# Patient Record
Sex: MALE | Race: WHITE | Employment: FULL TIME | URBAN - METROPOLITAN AREA
[De-identification: names, ages, dates, MRNs, and addresses within clinical notes are randomized per-mention and may not be internally consistent; named-entity substitution may affect disease eponyms.]

---

## 2024-07-23 ENCOUNTER — OFFICE VISIT (OUTPATIENT)
Dept: PRIMARY CARE CLINIC | Age: 49
End: 2024-07-23
Payer: COMMERCIAL

## 2024-07-23 VITALS
TEMPERATURE: 99 F | DIASTOLIC BLOOD PRESSURE: 82 MMHG | SYSTOLIC BLOOD PRESSURE: 134 MMHG | OXYGEN SATURATION: 97 % | WEIGHT: 224 LBS | HEIGHT: 69 IN | BODY MASS INDEX: 33.18 KG/M2 | HEART RATE: 105 BPM

## 2024-07-23 DIAGNOSIS — Z12.11 COLON CANCER SCREENING: ICD-10-CM

## 2024-07-23 DIAGNOSIS — R09.89 UPPER RESPIRATORY SYMPTOM: ICD-10-CM

## 2024-07-23 DIAGNOSIS — Z76.89 ENCOUNTER TO ESTABLISH CARE WITH NEW DOCTOR: Primary | ICD-10-CM

## 2024-07-23 DIAGNOSIS — H66.002 NON-RECURRENT ACUTE SUPPURATIVE OTITIS MEDIA OF LEFT EAR WITHOUT SPONTANEOUS RUPTURE OF TYMPANIC MEMBRANE: ICD-10-CM

## 2024-07-23 PROCEDURE — 99204 OFFICE O/P NEW MOD 45 MIN: CPT | Performed by: STUDENT IN AN ORGANIZED HEALTH CARE EDUCATION/TRAINING PROGRAM

## 2024-07-23 PROCEDURE — G2211 COMPLEX E/M VISIT ADD ON: HCPCS | Performed by: STUDENT IN AN ORGANIZED HEALTH CARE EDUCATION/TRAINING PROGRAM

## 2024-07-23 RX ORDER — AMOXICILLIN AND CLAVULANATE POTASSIUM 875; 125 MG/1; MG/1
1 TABLET, FILM COATED ORAL 2 TIMES DAILY
Qty: 10 TABLET | Refills: 0 | Status: SHIPPED | OUTPATIENT
Start: 2024-07-23 | End: 2024-07-28

## 2024-07-23 ASSESSMENT — ENCOUNTER SYMPTOMS
SORE THROAT: 0
SINUS PRESSURE: 0
NAUSEA: 1
ABDOMINAL PAIN: 0
SHORTNESS OF BREATH: 1
DIARRHEA: 0
COUGH: 1

## 2024-07-23 NOTE — PROGRESS NOTES
work  Referral for colonoscopy  Can discuss Shingles vaccine at follow-up    Return in about 3 months (around 10/23/2024) for Routine.    Orders Placed This Encounter   Procedures    CBC with Auto Differential     Standing Status:   Future     Standing Expiration Date:   7/23/2025    Comprehensive Metabolic Panel     Standing Status:   Future     Standing Expiration Date:   7/23/2025    Leonardo Kunz MD, General Surgery, [place]     Referral Priority:   Routine     Referral Type:   Eval and Treat     Referral Reason:   Specialty Services Required     Referred to Provider:   Leonardo Wallace MD     Requested Specialty:   General Surgery     Number of Visits Requested:   1     Orders Placed This Encounter   Medications    amoxicillin-clavulanate (AUGMENTIN) 875-125 MG per tablet     Sig: Take 1 tablet by mouth 2 times daily for 5 days     Dispense:  10 tablet     Refill:  0       Discussed use, benefit, and side effects of prescribed medications. All patient questions answered. Pt voiced understanding. Reviewed health maintenance. Patient agreed with treatment plan. Follow up as directed.     Electronically signed by Celestina Masy DO on 7/23/2024 at 5:06 PM

## 2024-09-09 ENCOUNTER — HOSPITAL ENCOUNTER (OUTPATIENT)
Age: 49
Setting detail: SPECIMEN
Discharge: HOME | End: 2024-09-09

## 2024-09-09 ENCOUNTER — OFFICE VISIT (OUTPATIENT)
Age: 49
End: 2024-09-09
Payer: COMMERCIAL

## 2024-09-09 VITALS
BODY MASS INDEX: 33.33 KG/M2 | SYSTOLIC BLOOD PRESSURE: 132 MMHG | HEART RATE: 77 BPM | DIASTOLIC BLOOD PRESSURE: 91 MMHG | HEIGHT: 69 IN | WEIGHT: 225 LBS | TEMPERATURE: 97.7 F

## 2024-09-09 DIAGNOSIS — R10.32 LEFT LOWER QUADRANT ABDOMINAL PAIN: ICD-10-CM

## 2024-09-09 DIAGNOSIS — K62.5 RECTAL BLEEDING: Primary | ICD-10-CM

## 2024-09-09 DIAGNOSIS — Z12.11 SCREENING FOR COLON CANCER: ICD-10-CM

## 2024-09-09 DIAGNOSIS — R10.11 RUQ ABDOMINAL PAIN: ICD-10-CM

## 2024-09-09 DIAGNOSIS — Z76.89 ENCOUNTER TO ESTABLISH CARE WITH NEW DOCTOR: ICD-10-CM

## 2024-09-09 LAB
ALBUMIN SERPL-MCNC: 5 G/DL (ref 3.5–5.2)
ALBUMIN/GLOB SERPL: 2 {RATIO} (ref 1–2.5)
ALP SERPL-CCNC: 73 U/L (ref 40–129)
ALT SERPL-CCNC: 39 U/L (ref 10–50)
ANION GAP SERPL CALCULATED.3IONS-SCNC: 12 MMOL/L (ref 9–16)
AST SERPL-CCNC: 31 U/L (ref 10–50)
BASOPHILS # BLD: 0.09 K/UL (ref 0–0.2)
BASOPHILS NFR BLD: 1 % (ref 0–2)
BILIRUB SERPL-MCNC: 0.4 MG/DL (ref 0–1.2)
BUN SERPL-MCNC: 16 MG/DL (ref 6–20)
CALCIUM SERPL-MCNC: 9.7 MG/DL (ref 8.6–10.4)
CHLORIDE SERPL-SCNC: 104 MMOL/L (ref 98–107)
CO2 SERPL-SCNC: 24 MMOL/L (ref 20–31)
CREAT SERPL-MCNC: 1 MG/DL (ref 0.7–1.2)
EOSINOPHIL # BLD: 0.32 K/UL (ref 0–0.44)
EOSINOPHILS RELATIVE PERCENT: 4 % (ref 1–4)
ERYTHROCYTE [DISTWIDTH] IN BLOOD BY AUTOMATED COUNT: 13.8 % (ref 11.8–14.4)
GFR, ESTIMATED: 87 ML/MIN/1.73M2
GLUCOSE SERPL-MCNC: 81 MG/DL (ref 74–99)
HCT VFR BLD AUTO: 47.5 % (ref 40.7–50.3)
HGB BLD-MCNC: 15 G/DL (ref 13–17)
IMM GRANULOCYTES # BLD AUTO: <0.03 K/UL (ref 0–0.3)
IMM GRANULOCYTES NFR BLD: 0 %
LYMPHOCYTES NFR BLD: 3.61 K/UL (ref 1.1–3.7)
LYMPHOCYTES RELATIVE PERCENT: 48 % (ref 24–43)
MCH RBC QN AUTO: 27.8 PG (ref 25.2–33.5)
MCHC RBC AUTO-ENTMCNC: 31.6 G/DL (ref 28.4–34.8)
MCV RBC AUTO: 88 FL (ref 82.6–102.9)
MONOCYTES NFR BLD: 0.74 K/UL (ref 0.1–1.2)
MONOCYTES NFR BLD: 10 % (ref 3–12)
NEUTROPHILS NFR BLD: 37 % (ref 36–65)
NEUTS SEG NFR BLD: 2.81 K/UL (ref 1.5–8.1)
NRBC BLD-RTO: 0 PER 100 WBC
PLATELET # BLD AUTO: 211 K/UL (ref 138–453)
PMV BLD AUTO: 9.7 FL (ref 8.1–13.5)
POTASSIUM SERPL-SCNC: 4.4 MMOL/L (ref 3.7–5.3)
PROT SERPL-MCNC: 7.5 G/DL (ref 6.6–8.7)
RBC # BLD AUTO: 5.4 M/UL (ref 4.21–5.77)
SODIUM SERPL-SCNC: 140 MMOL/L (ref 136–145)
WBC OTHER # BLD: 7.6 K/UL (ref 3.5–11.3)

## 2024-09-09 PROCEDURE — 99203 OFFICE O/P NEW LOW 30 MIN: CPT | Performed by: NURSE PRACTITIONER

## 2024-09-09 RX ORDER — IBUPROFEN 200 MG
200 TABLET ORAL EVERY 6 HOURS PRN
COMMUNITY

## 2024-09-09 ASSESSMENT — ENCOUNTER SYMPTOMS
SORE THROAT: 0
SHORTNESS OF BREATH: 0
COUGH: 0
RHINORRHEA: 0

## 2024-09-12 ENCOUNTER — TELEPHONE (OUTPATIENT)
Age: 49
End: 2024-09-12

## 2024-09-12 RX ORDER — BISACODYL 5 MG/1
TABLET, DELAYED RELEASE ORAL
Qty: 4 TABLET | Refills: 0 | Status: SHIPPED | OUTPATIENT
Start: 2024-09-12

## 2024-09-12 RX ORDER — POLYETHYLENE GLYCOL 3350 17 G/17G
POWDER, FOR SOLUTION ORAL
Qty: 238 G | Refills: 0 | Status: SHIPPED | OUTPATIENT
Start: 2024-09-12

## 2024-09-12 RX ORDER — ONDANSETRON 4 MG/1
4 TABLET, FILM COATED ORAL EVERY 6 HOURS PRN
Qty: 10 TABLET | Refills: 0 | Status: SHIPPED | OUTPATIENT
Start: 2024-09-12

## 2024-10-10 ENCOUNTER — HOSPITAL ENCOUNTER (OUTPATIENT)
Dept: PREADMISSION TESTING | Age: 49
Discharge: HOME | End: 2024-10-14

## 2024-10-10 VITALS — WEIGHT: 225 LBS | BODY MASS INDEX: 33.33 KG/M2 | HEIGHT: 69 IN

## 2024-10-10 NOTE — PROGRESS NOTES
Pre-op Instructions For Out-Patient Endoscopy Surgery    Medication Instructions:  Please stop herbs and any supplements now (includes vitamins and minerals).    Please contact your surgeon and prescribing physician for pre-op instructions for any blood thinners. STOP IBUPROFEN AS DIRECTED    If you have inhalers/aerosol treatments at home, please use them the morning of your surgery and bring the inhalers with you to the hospital.    Please take the following medications the morning of your surgery with a sip of water:    None    Surgery Instructions:  After midnight before surgery:  Do not eat or drink anything, including water, mints, gum, and hard candy.  You may brush your teeth without swallowing.  No smoking, chewing tobacco, or street drugs.    Please shower or bathe before surgery.       Please do not wear any cologne, lotion, powder, jewelry, piercings, perfume, makeup, nail polish, hair accessories, or hair spray on the day of surgery.  Wear loose comfortable clothing.    Leave your valuables at home but bring a payment source for any after-surgery prescriptions you plan to fill at [facility].  Bring a storage case for any glasses/contacts.    An adult who is responsible for you MUST drive you home and should be with you for the first 24 hours after surgery.     The Day of Surgery:  Arrive at [facility] Surgery Entrance at the time directed by your surgeon and check in at the desk.     If you have a living will or healthcare power of , please bring a copy.    You will be taken to the pre-op holding area where you will be prepared for surgery.  A physical assessment will be performed by a nurse practitioner or house officer.  Your IV will be started and you will meet your anesthesiologist.    When you go to surgery, your family will be directed to the surgical waiting room, where the doctor should speak with them after your surgery.    After surgery, you will be taken to

## 2024-10-28 ENCOUNTER — OFFICE VISIT (OUTPATIENT)
Dept: PRIMARY CARE CLINIC | Age: 49
End: 2024-10-28
Payer: COMMERCIAL

## 2024-10-28 VITALS
WEIGHT: 227 LBS | BODY MASS INDEX: 33.62 KG/M2 | HEIGHT: 69 IN | SYSTOLIC BLOOD PRESSURE: 134 MMHG | OXYGEN SATURATION: 94 % | HEART RATE: 90 BPM | DIASTOLIC BLOOD PRESSURE: 84 MMHG

## 2024-10-28 DIAGNOSIS — Z13.6 ENCOUNTER FOR SCREENING FOR CARDIOVASCULAR DISORDERS: Primary | ICD-10-CM

## 2024-10-28 PROCEDURE — 99213 OFFICE O/P EST LOW 20 MIN: CPT | Performed by: STUDENT IN AN ORGANIZED HEALTH CARE EDUCATION/TRAINING PROGRAM

## 2024-10-28 PROCEDURE — G2211 COMPLEX E/M VISIT ADD ON: HCPCS | Performed by: STUDENT IN AN ORGANIZED HEALTH CARE EDUCATION/TRAINING PROGRAM

## 2024-10-28 ASSESSMENT — ENCOUNTER SYMPTOMS
ABDOMINAL PAIN: 0
NAUSEA: 0
VOMITING: 0
SHORTNESS OF BREATH: 0

## 2024-10-28 NOTE — PROGRESS NOTES
[facility]  [facility]  [address]  [facility] [place] [place] [address]  Dept: [phone]    Travis Yoder IV is a 49 y.o. male established patient, who presents today for his medical conditions/complaints as noted below:    Chief Complaint   Patient presents with    Discuss Labs     Patient had lab work done     Other     Patient states his cough  and fatigue went away        HPI:     Here to discuss Shingles vaccine    Completed lab work. Scheduled for colonoscopy    Reviewed prior notes: previous PCP  Reviewed previous labs.    No components found for: \"LDLCHOLESTEROL\", \"LDLCALC\"    (goal LDL is <100)   AST (U/L)   Date Value   09/09/2024 31     ALT (U/L)   Date Value   09/09/2024 39     BUN (mg/dL)   Date Value   09/09/2024 16     BP Readings from Last 3 Encounters:   10/28/24 134/84   09/09/24 (!) 132/91   07/23/24 134/82          (goal 120/80)    No past medical history on file.   Past Surgical History:   Procedure Laterality Date    ADENOIDECTOMY Bilateral     as a child    VASECTOMY         No family history on file.    Social History     Tobacco Use    Smoking status: Never    Smokeless tobacco: Never   Substance Use Topics    Alcohol use: Not Currently      Current Outpatient Medications   Medication Sig Dispense Refill    bisacodyl (DULCOLAX) 5 MG EC tablet Take all 4 dulcolax tablets together by mouth at 10:00 AM the day prior to procedure. (Patient not taking: Reported on 10/28/2024) 4 tablet 0    polyethylene glycol (GLYCOLAX) 17 GM/SCOOP powder Mix 238 grams of Miralax with 64 ounces of Gatorade (not red or purple) and consume all liquid between 6 PM and 8 PM the evening prior to procedure. (Patient not taking: Reported on 10/28/2024) 238 g 0    ondansetron (ZOFRAN) 4 MG tablet Take 1 tablet by mouth every 6 hours as needed for Nausea or Vomiting (Patient not taking: Reported on 10/28/2024) 10 tablet 0    ibuprofen (ADVIL;MOTRIN) 200 MG tablet Take 1 tablet by

## 2024-10-31 NOTE — PRE-PROCEDURE INSTRUCTIONS
No answer, left message ? Yes, voicemail                            Unable to leave message ?    When were you told to arrive at hospital ?  0845    Do you have a  ?    Are you on any blood thinners ?    no                 If yes when did you stop taking ?    Do you have your prep Rx filled and instruction ?      Nothing to eat the day before , only clear liquids.    Are you experiencing any covid symptoms ?     Do you have any infections or rash we should be aware of ?      Do you have the Hibiclens soap to use the night before and the morning of surgery ?n/a    Nothing to eat or drink after midnight, only a sip of water to take any medication instructed to take the night before.  Wear comfortable clothing, leave any valuables at home, remove any jewelry and body piercing .

## 2024-11-01 ENCOUNTER — ANESTHESIA EVENT (OUTPATIENT)
Dept: ENDOSCOPY | Age: 49
End: 2024-11-01
Payer: COMMERCIAL

## 2024-11-04 ENCOUNTER — ANESTHESIA (OUTPATIENT)
Dept: ENDOSCOPY | Age: 49
End: 2024-11-04
Payer: COMMERCIAL

## 2024-11-04 ENCOUNTER — HOSPITAL ENCOUNTER (OUTPATIENT)
Age: 49
Setting detail: OUTPATIENT SURGERY
Discharge: HOME | End: 2024-11-04
Attending: SURGERY | Admitting: SURGERY
Payer: COMMERCIAL

## 2024-11-04 VITALS
WEIGHT: 225 LBS | HEIGHT: 69 IN | SYSTOLIC BLOOD PRESSURE: 110 MMHG | BODY MASS INDEX: 33.33 KG/M2 | TEMPERATURE: 97.1 F | RESPIRATION RATE: 16 BRPM | HEART RATE: 80 BPM | OXYGEN SATURATION: 97 % | DIASTOLIC BLOOD PRESSURE: 54 MMHG

## 2024-11-04 PROCEDURE — 7100000010 HC PHASE II RECOVERY - FIRST 15 MIN: Performed by: SURGERY

## 2024-11-04 PROCEDURE — 7100000011 HC PHASE II RECOVERY - ADDTL 15 MIN: Performed by: SURGERY

## 2024-11-04 PROCEDURE — 2709999900 HC NON-CHARGEABLE SUPPLY: Performed by: SURGERY

## 2024-11-04 PROCEDURE — 2500000003 HC RX 250 WO HCPCS: Performed by: NURSE ANESTHETIST, CERTIFIED REGISTERED

## 2024-11-04 PROCEDURE — 45378 DIAGNOSTIC COLONOSCOPY: CPT | Performed by: SURGERY

## 2024-11-04 PROCEDURE — 3609027000 HC COLONOSCOPY: Performed by: SURGERY

## 2024-11-04 PROCEDURE — 2500000003 HC RX 250 WO HCPCS: Performed by: ANESTHESIOLOGY

## 2024-11-04 PROCEDURE — 2580000003 HC RX 258: Performed by: ANESTHESIOLOGY

## 2024-11-04 PROCEDURE — 7100000000 HC PACU RECOVERY - FIRST 15 MIN: Performed by: SURGERY

## 2024-11-04 PROCEDURE — 6360000002 HC RX W HCPCS: Performed by: NURSE ANESTHETIST, CERTIFIED REGISTERED

## 2024-11-04 PROCEDURE — 7100000031 HC ASPR PHASE II RECOVERY - ADDTL 15 MIN: Performed by: SURGERY

## 2024-11-04 PROCEDURE — 3700000000 HC ANESTHESIA ATTENDED CARE: Performed by: SURGERY

## 2024-11-04 PROCEDURE — 3700000001 HC ADD 15 MINUTES (ANESTHESIA): Performed by: SURGERY

## 2024-11-04 PROCEDURE — 7100000001 HC PACU RECOVERY - ADDTL 15 MIN: Performed by: SURGERY

## 2024-11-04 PROCEDURE — 7100000030 HC ASPR PHASE II RECOVERY - FIRST 15 MIN: Performed by: SURGERY

## 2024-11-04 PROCEDURE — 2580000003 HC RX 258: Performed by: NURSE ANESTHETIST, CERTIFIED REGISTERED

## 2024-11-04 RX ORDER — FENTANYL CITRATE 50 UG/ML
INJECTION, SOLUTION INTRAMUSCULAR; INTRAVENOUS
Status: DISCONTINUED | OUTPATIENT
Start: 2024-11-04 | End: 2024-11-04 | Stop reason: SDUPTHER

## 2024-11-04 RX ORDER — SODIUM CHLORIDE 0.9 % (FLUSH) 0.9 %
5-40 SYRINGE (ML) INJECTION EVERY 12 HOURS SCHEDULED
Status: DISCONTINUED | OUTPATIENT
Start: 2024-11-04 | End: 2024-11-04 | Stop reason: HOSPADM

## 2024-11-04 RX ORDER — PROPOFOL 10 MG/ML
INJECTION, EMULSION INTRAVENOUS
Status: DISCONTINUED | OUTPATIENT
Start: 2024-11-04 | End: 2024-11-04 | Stop reason: SDUPTHER

## 2024-11-04 RX ORDER — SODIUM CHLORIDE, SODIUM LACTATE, POTASSIUM CHLORIDE, CALCIUM CHLORIDE 600; 310; 30; 20 MG/100ML; MG/100ML; MG/100ML; MG/100ML
INJECTION, SOLUTION INTRAVENOUS
Status: DISCONTINUED | OUTPATIENT
Start: 2024-11-04 | End: 2024-11-04 | Stop reason: SDUPTHER

## 2024-11-04 RX ORDER — SODIUM CHLORIDE, SODIUM LACTATE, POTASSIUM CHLORIDE, CALCIUM CHLORIDE 600; 310; 30; 20 MG/100ML; MG/100ML; MG/100ML; MG/100ML
INJECTION, SOLUTION INTRAVENOUS CONTINUOUS
Status: DISCONTINUED | OUTPATIENT
Start: 2024-11-04 | End: 2024-11-04 | Stop reason: HOSPADM

## 2024-11-04 RX ORDER — LIDOCAINE HYDROCHLORIDE 10 MG/ML
1 INJECTION, SOLUTION EPIDURAL; INFILTRATION; INTRACAUDAL; PERINEURAL
Status: COMPLETED | OUTPATIENT
Start: 2024-11-04 | End: 2024-11-04

## 2024-11-04 RX ORDER — MIDAZOLAM HYDROCHLORIDE 1 MG/ML
INJECTION, SOLUTION INTRAMUSCULAR; INTRAVENOUS
Status: DISCONTINUED | OUTPATIENT
Start: 2024-11-04 | End: 2024-11-04 | Stop reason: SDUPTHER

## 2024-11-04 RX ORDER — SODIUM CHLORIDE 0.9 % (FLUSH) 0.9 %
5-40 SYRINGE (ML) INJECTION PRN
Status: DISCONTINUED | OUTPATIENT
Start: 2024-11-04 | End: 2024-11-04 | Stop reason: HOSPADM

## 2024-11-04 RX ORDER — LIDOCAINE HYDROCHLORIDE 20 MG/ML
INJECTION, SOLUTION EPIDURAL; INFILTRATION; INTRACAUDAL; PERINEURAL
Status: DISCONTINUED | OUTPATIENT
Start: 2024-11-04 | End: 2024-11-04 | Stop reason: SDUPTHER

## 2024-11-04 RX ORDER — SODIUM CHLORIDE 9 MG/ML
INJECTION, SOLUTION INTRAVENOUS PRN
Status: DISCONTINUED | OUTPATIENT
Start: 2024-11-04 | End: 2024-11-04 | Stop reason: HOSPADM

## 2024-11-04 RX ORDER — PHENYLEPHRINE HYDROCHLORIDE 10 MG/ML
INJECTION INTRAVENOUS
Status: DISCONTINUED | OUTPATIENT
Start: 2024-11-04 | End: 2024-11-04 | Stop reason: SDUPTHER

## 2024-11-04 RX ADMIN — LIDOCAINE HYDROCHLORIDE 1 ML: 10 INJECTION, SOLUTION EPIDURAL; INFILTRATION; INTRACAUDAL; PERINEURAL at 10:05

## 2024-11-04 RX ADMIN — SODIUM CHLORIDE, PRESERVATIVE FREE 10 ML: 5 INJECTION INTRAVENOUS at 10:05

## 2024-11-04 RX ADMIN — PHENYLEPHRINE HYDROCHLORIDE 100 MCG: 10 INJECTION INTRAVENOUS at 11:34

## 2024-11-04 RX ADMIN — FENTANYL CITRATE 50 MCG: 50 INJECTION INTRAMUSCULAR; INTRAVENOUS at 11:24

## 2024-11-04 RX ADMIN — MIDAZOLAM 2 MG: 1 INJECTION INTRAMUSCULAR; INTRAVENOUS at 11:05

## 2024-11-04 RX ADMIN — LIDOCAINE HYDROCHLORIDE 60 MG: 20 INJECTION, SOLUTION EPIDURAL; INFILTRATION; INTRACAUDAL; PERINEURAL at 11:09

## 2024-11-04 RX ADMIN — SODIUM CHLORIDE, POTASSIUM CHLORIDE, SODIUM LACTATE AND CALCIUM CHLORIDE: 600; 310; 30; 20 INJECTION, SOLUTION INTRAVENOUS at 11:06

## 2024-11-04 RX ADMIN — PROPOFOL 200 MG: 10 INJECTION, EMULSION INTRAVENOUS at 11:09

## 2024-11-04 RX ADMIN — FENTANYL CITRATE 50 MCG: 50 INJECTION INTRAMUSCULAR; INTRAVENOUS at 11:18

## 2024-11-04 ASSESSMENT — ENCOUNTER SYMPTOMS
TROUBLE SWALLOWING: 0
APNEA: 0
SHORTNESS OF BREATH: 0
BACK PAIN: 0
SORE THROAT: 0
COUGH: 1

## 2024-11-04 ASSESSMENT — PAIN - FUNCTIONAL ASSESSMENT
PAIN_FUNCTIONAL_ASSESSMENT: NONE - DENIES PAIN
PAIN_FUNCTIONAL_ASSESSMENT: NONE - DENIES PAIN
PAIN_FUNCTIONAL_ASSESSMENT: 0-10

## 2024-11-04 NOTE — OP NOTE
[facility] Surgery   Leonardo Wallace MD, FACS  Viola Stacy, APRN-CNP  [address], [address]  [place] [address]  P: [phone], F: [phone]    PROCEDURE NOTE    DATE OF PROCEDURE: 11/4/2024    SURGEON: Leonardo Wallace MD    ASSISTANT: None    PREOPERATIVE DIAGNOSIS: Rectal bleeding    POSTOPERATIVE DIAGNOSIS: Grossly normal exam    OPERATION: Total colonoscopy to cecum with intubation of terminal ileum    ANESTHESIA: General    ESTIMATED BLOOD LOSS: None    COMPLICATIONS: None     SPECIMENS:  Was Not Obtained    HISTORY: The patient is a 49 y.o. year old male with history of above preop diagnosis.  I recommended colonoscopy with possible biopsy or polypectomy and I explained the risk, benefits, expected outcome, and alternatives to the procedure.  Risks included but are not limited to bleeding, infection, respiratory distress, hypotension, and perforation of the colon and possibility of missing a lesion.  The patient understands and is in agreement.      PROCEDURE: The patient was given IV conscious sedation.  The patient's SPO2 remained above 90% throughout the procedure. Digital rectal exam was normal.  The colonoscope was inserted through the anus into the rectum and advanced under direct vision to the cecum without difficulty.  Terminal ileum was examined for approximately 2 inches.  The prep was good.      Findings:  Terminal ileum: normal    Cecum/Ascending colon: normal    Transverse colon: normal    Descending/Sigmoid colon: normal    Rectum/Anus: examined in normal and retroflexed positions and was normal    Withdrawal Time was (minutes): 18      Next screening colonoscopy: 10 years.      The colon was decompressed.  While withdrawing the scope the above findings were verified and the scope was removed.  The patient tolerated the procedure and conscious sedation without unusual events.    In the recovery room patient was examined and remains hemodynamically stable.

## 2024-11-04 NOTE — ANESTHESIA PRE PROCEDURE
pertinent past medical history.    Past Surgical History:        Procedure Laterality Date   • ADENOIDECTOMY Bilateral     as a child   • VASECTOMY         Social History:    Social History     Tobacco Use   • Smoking status: Never   • Smokeless tobacco: Never   Substance Use Topics   • Alcohol use: Not Currently                                Counseling given: Not Answered      Vital Signs (Current):   Vitals:    11/04/24 0953   BP: 128/80   Pulse: 85   Resp: 16   Temp: 97.4 °F (36.3 °C)   TempSrc: Infrared   SpO2: 94%   Weight: 102.1 kg (225 lb)   Height: 1.753 m (5' 9\")                                              BP Readings from Last 3 Encounters:   11/04/24 128/80   10/28/24 134/84   09/09/24 (!) 132/91       NPO Status: Time of last liquid consumption: 2200                        Time of last solid consumption: 2100                        Date of last liquid consumption: 11/03/24                        Date of last solid food consumption: 11/02/24    BMI:   Wt Readings from Last 3 Encounters:   11/04/24 102.1 kg (225 lb)   10/28/24 103 kg (227 lb)   10/10/24 102.1 kg (225 lb)     Body mass index is 33.23 kg/m².    CBC:   Lab Results   Component Value Date/Time    WBC 7.6 09/09/2024 12:00 PM    RBC 5.40 09/09/2024 12:00 PM    HGB 15.0 09/09/2024 12:00 PM    HCT 47.5 09/09/2024 12:00 PM    MCV 88.0 09/09/2024 12:00 PM    RDW 13.8 09/09/2024 12:00 PM     09/09/2024 12:00 PM       CMP:   Lab Results   Component Value Date/Time     09/09/2024 12:00 PM    K 4.4 09/09/2024 12:00 PM     09/09/2024 12:00 PM    CO2 24 09/09/2024 12:00 PM    BUN 16 09/09/2024 12:00 PM    CREATININE 1.0 09/09/2024 12:00 PM    LABGLOM 87 09/09/2024 12:00 PM    GLUCOSE 81 09/09/2024 12:00 PM    CALCIUM 9.7 09/09/2024 12:00 PM    BILITOT 0.4 09/09/2024 12:00 PM    ALKPHOS 73 09/09/2024 12:00 PM    AST 31 09/09/2024 12:00 PM    ALT 39 09/09/2024 12:00 PM       POC Tests: No results for input(s): \"POCGLU\", \"POCNA\", \"POCK\",

## 2024-11-04 NOTE — ANESTHESIA POSTPROCEDURE EVALUATION
Department of Anesthesiology  Postprocedure Note    Patient: Travis Yoder IV  MRN: [id number]  YOB: 1975  Date of evaluation: 11/4/2024    Procedure Summary       Date: 11/04/24 Room / Location: [place] [address] / [facility]    Anesthesia Start: 1106 Anesthesia Stop: 1209    Procedure: COLONOSCOPY DIAGNOSTIC (Rectum) Diagnosis:       Rectal bleeding      Right upper quadrant abdominal pain      (Rectal bleeding [K62.5])      (Right upper quadrant abdominal pain [R10.11])    Surgeons: Leonardo Wallace MD Responsible Provider: Gavi Martinez MD    Anesthesia Type: general ASA Status: 2            Anesthesia Type: No value filed.    Perlita Phase I: Perlita Score: 10    Perlita Phase II: Perlita Score: 10    Anesthesia Post Evaluation    Comments: POST- ANESTHESIA EVALUATION       Pt Name: Travis Yoder IV  MRN: [id number]  YOB: 1975  Date of evaluation: 11/4/2024  Time:  2:58 PM      BP (!) 110/54   Pulse 80   Temp 97.1 °F (36.2 °C) (Infrared)   Resp 16   Ht 1.753 m (5' 9\")   Wt 102.1 kg (225 lb)   SpO2 97%   BMI 33.23 kg/m²      Consciousness Level  Awake  Cardiopulmonary Status  Stable  Pain Adequately Treated YES  Nausea / Vomiting  NO  Adequate Hydration  YES  Anesthesia Related Complications NONE      Electronically signed by Gavi Martinez MD on 11/4/2024 at 2:58 PM           No notable events documented.

## 2024-11-04 NOTE — H&P
HISTORY and PHYSICAL  [facility]       NAME:  Travis Yoder IV  MRN: [id number]   YOB: 1975   Date: 11/4/2024   Age: 49 y.o.  Gender: male       COMPLAINT AND PRESENT HISTORY:     Travis Yoder IV is 49 y.o.,  male, presents for COLONOSCOPY DIAGNOSTIC   Primary dx: Rectal bleeding [K62.5]  Right upper quadrant abdominal pain [R10.11].    Office visit per Viola HORTON on 9/9/2024  HISTORY OF PRESENT ILLNESS: 49 y.o. male presents to discuss scheduling a colonoscopy.      Colonoscopy:  Last Colonoscopy Date (if applicable):  None prior  Rectal Bleeding: yes - some blood noted in tissue and stool, thinks possible hemorrhoid- bleeding stopped- denies rectal pain/itching consistently- did have symptoms at the time  Abdominal Pain: yes - intermittent to RUQ, LLQ intermittent  Changes in Bowel Habits: yes - constipation  Nausea / Vomiting: no  Weight Loss: no  Family Hx of Colon CA: no     Major medical hx: None  Prior ABD/pelvic surgeries: None  Blood thinning medications: NSAID PRN    UPDATE 11/4/2024  Travis Yoder IV is 49 y.o.,   male, having a Diagnostic Colonoscopy.  No prior Colonoscopy was done      Patient denies any  FH of Colon Cancer.       Pt reports h/o GI /Rectal bleeding, experiencing on tissue paper and in stools.  Pt states no recent bleeding.  Denies h/o dark stools.      Patient has a history of abd. pain in the RUQ and LLQ area, The pain is dull ache  Pt denies any recent abdominal pain.    Patient denies any Dysphagia.  Pt has hx of heart burn  Pt states he takes a TUMS as needed.       Patient has an onset of symptoms:     No nausea / vomiting.  No diarrhea.  Occasional constipation.   No fever or chills.  Denies recent chest pain or SOB.     Prep fully completed: yes.   Pt reports his last BM is clear liquid      Review of additional significant medical hx:  (See chart for additional detail, including current medications /see ROS for current S/S):     NPO

## 2024-11-04 NOTE — DISCHARGE INSTRUCTIONS
DISCHARGE INSTRUCTIONS FOR COLONOSCOPY    In order to continue your care at home, please follow the instructions below.    For General Anesthesia:  Do not drink any alcoholic beverages or make any legal or important decisions for 24 hours.    Do not drive or operate machinery for 24 hours.  You may return to work after 24 hours.        Diet    Drink plenty of fluids after surgery, unless you are on a fluid restriction.   After general anesthesia, start out eating lightly (broth, soup, bread, etc.) advancing as tolerated to your usual diet.  Try to avoid spicy or greasy/fatty foods for 24 hours.   Avoid milk/milk product for several hours.       Medications  Take medications as ordered by your surgeon.    Activities  Limit your activities for 24 hours.  Walk around to help pass gas.  You may shower.    Call your surgeon for the following:  If you have abdominal pain that is not relieved by passing gas.   For an oral temperature (by mouth) is 101 degrees or higher, chills or excessive sweating.  Persistent nausea or vomiting  Rectal bleeding (may be red, maroon, or black) or change in your bowel habits.  Redness or swelling at the IV site.  If you are unable to urinate within 8 hours of surgery.  For any questions or concerns you may have.      Next screening colonoscopy: 10 years.

## (undated) DEVICE — KIT CLN UP LIN W/ STD SAHARA TBL SHT 40X60IN DRAW/LIFT SHT

## (undated) DEVICE — DEFENDO AIR WATER SUCTION AND BIOPSY VALVE KIT FOR  OLYMPUS: Brand: DEFENDO AIR/WATER/SUCTION AND BIOPSY VALVE

## (undated) DEVICE — ENDO KIT W/SYRINGE: Brand: MEDLINE INDUSTRIES, INC.

## (undated) DEVICE — GLOVE ORANGE PI 7 1/2   MSG9075